# Patient Record
Sex: FEMALE | Race: WHITE | NOT HISPANIC OR LATINO | Employment: FULL TIME | URBAN - METROPOLITAN AREA
[De-identification: names, ages, dates, MRNs, and addresses within clinical notes are randomized per-mention and may not be internally consistent; named-entity substitution may affect disease eponyms.]

---

## 2018-05-13 ENCOUNTER — HOSPITAL ENCOUNTER (EMERGENCY)
Facility: CLINIC | Age: 48
Discharge: HOME | End: 2018-05-13
Attending: EMERGENCY MEDICINE | Admitting: EMERGENCY MEDICINE
Payer: COMMERCIAL

## 2018-05-13 ENCOUNTER — APPOINTMENT (OUTPATIENT)
Dept: ULTRASOUND IMAGING | Facility: CLINIC | Age: 48
End: 2018-05-13
Attending: EMERGENCY MEDICINE
Payer: COMMERCIAL

## 2018-05-13 VITALS
DIASTOLIC BLOOD PRESSURE: 52 MMHG | TEMPERATURE: 98.2 F | RESPIRATION RATE: 18 BRPM | HEIGHT: 66 IN | SYSTOLIC BLOOD PRESSURE: 98 MMHG | OXYGEN SATURATION: 96 %

## 2018-05-13 DIAGNOSIS — R10.2 PELVIC PAIN IN FEMALE: ICD-10-CM

## 2018-05-13 DIAGNOSIS — N84.0 ENDOMETRIAL POLYP: ICD-10-CM

## 2018-05-13 DIAGNOSIS — D25.9 UTERINE LEIOMYOMA, UNSPECIFIED LOCATION: ICD-10-CM

## 2018-05-13 LAB
ALBUMIN SERPL-MCNC: 3.4 G/DL (ref 3.4–5)
ALP SERPL-CCNC: 43 U/L (ref 40–150)
ALT SERPL W P-5'-P-CCNC: 13 U/L (ref 0–50)
ANION GAP SERPL CALCULATED.3IONS-SCNC: 11 MMOL/L (ref 3–14)
AST SERPL W P-5'-P-CCNC: 17 U/L (ref 0–45)
BILIRUB SERPL-MCNC: 0.4 MG/DL (ref 0.2–1.3)
BUN SERPL-MCNC: 13 MG/DL (ref 7–30)
CALCIUM SERPL-MCNC: 8.3 MG/DL (ref 8.5–10.1)
CHLORIDE SERPL-SCNC: 111 MMOL/L (ref 94–109)
CO2 SERPL-SCNC: 21 MMOL/L (ref 20–32)
CREAT SERPL-MCNC: 0.83 MG/DL (ref 0.52–1.04)
ERYTHROCYTE [DISTWIDTH] IN BLOOD BY AUTOMATED COUNT: 12.2 % (ref 10–15)
GFR SERPL CREATININE-BSD FRML MDRD: 73 ML/MIN/1.7M2
GLUCOSE SERPL-MCNC: 102 MG/DL (ref 70–99)
HCT VFR BLD AUTO: 32.9 % (ref 35–47)
HGB BLD-MCNC: 11.3 G/DL (ref 11.7–15.7)
LIPASE SERPL-CCNC: 107 U/L (ref 73–393)
MCH RBC QN AUTO: 30.9 PG (ref 26.5–33)
MCHC RBC AUTO-ENTMCNC: 34.3 G/DL (ref 31.5–36.5)
MCV RBC AUTO: 90 FL (ref 78–100)
PLATELET # BLD AUTO: 234 10E9/L (ref 150–450)
POTASSIUM SERPL-SCNC: 3.2 MMOL/L (ref 3.4–5.3)
PROT SERPL-MCNC: 6.8 G/DL (ref 6.8–8.8)
RBC # BLD AUTO: 3.66 10E12/L (ref 3.8–5.2)
SODIUM SERPL-SCNC: 143 MMOL/L (ref 133–144)
WBC # BLD AUTO: 12.1 10E9/L (ref 4–11)

## 2018-05-13 PROCEDURE — 96375 TX/PRO/DX INJ NEW DRUG ADDON: CPT

## 2018-05-13 PROCEDURE — 96374 THER/PROPH/DIAG INJ IV PUSH: CPT

## 2018-05-13 PROCEDURE — 96361 HYDRATE IV INFUSION ADD-ON: CPT

## 2018-05-13 PROCEDURE — 76856 US EXAM PELVIC COMPLETE: CPT

## 2018-05-13 PROCEDURE — 83690 ASSAY OF LIPASE: CPT | Performed by: EMERGENCY MEDICINE

## 2018-05-13 PROCEDURE — 99285 EMERGENCY DEPT VISIT HI MDM: CPT | Mod: 25

## 2018-05-13 PROCEDURE — 85027 COMPLETE CBC AUTOMATED: CPT | Performed by: EMERGENCY MEDICINE

## 2018-05-13 PROCEDURE — 25000128 H RX IP 250 OP 636: Performed by: EMERGENCY MEDICINE

## 2018-05-13 PROCEDURE — 80053 COMPREHEN METABOLIC PANEL: CPT | Performed by: EMERGENCY MEDICINE

## 2018-05-13 RX ORDER — METHOCARBAMOL 750 MG/1
750 TABLET, FILM COATED ORAL 4 TIMES DAILY PRN
Qty: 15 TABLET | Refills: 0 | Status: SHIPPED | OUTPATIENT
Start: 2018-05-13 | End: 2022-09-20

## 2018-05-13 RX ORDER — HYDROCODONE BITARTRATE AND ACETAMINOPHEN 5; 325 MG/1; MG/1
1 TABLET ORAL EVERY 4 HOURS PRN
Qty: 15 TABLET | Refills: 0 | Status: SHIPPED | OUTPATIENT
Start: 2018-05-13 | End: 2022-09-20

## 2018-05-13 RX ORDER — FLUTICASONE PROPIONATE 50 MCG
1 SPRAY, SUSPENSION (ML) NASAL DAILY
COMMUNITY

## 2018-05-13 RX ORDER — ONDANSETRON 2 MG/ML
4 INJECTION INTRAMUSCULAR; INTRAVENOUS ONCE
Status: COMPLETED | OUTPATIENT
Start: 2018-05-13 | End: 2018-05-13

## 2018-05-13 RX ORDER — MORPHINE SULFATE 4 MG/ML
4 INJECTION, SOLUTION INTRAMUSCULAR; INTRAVENOUS ONCE
Status: COMPLETED | OUTPATIENT
Start: 2018-05-13 | End: 2018-05-13

## 2018-05-13 RX ADMIN — MORPHINE SULFATE 4 MG: 4 INJECTION, SOLUTION INTRAMUSCULAR; INTRAVENOUS at 00:44

## 2018-05-13 RX ADMIN — SODIUM CHLORIDE 1000 ML: 9 INJECTION, SOLUTION INTRAVENOUS at 00:42

## 2018-05-13 RX ADMIN — ONDANSETRON 4 MG: 2 INJECTION INTRAMUSCULAR; INTRAVENOUS at 00:43

## 2018-05-13 NOTE — ED AVS SNAPSHOT
Emergency Department    [address] [facility] [place] [address]    Phone:  [phone]    Fax:  [phone]                                       Pamella Lenz   MRN: [id number]    Department:   Emergency Department   Date of Visit:  5/13/2018           Patient Information     Date Of Birth          1970        Your diagnoses for this visit were:     Endometrial polyp     Pelvic pain in female     Uterine leiomyoma, unspecified location        You were seen by Ronald Sahni MD.      Follow-up Information     Follow up with your ob/gyn In 2 days.        Follow up with  Emergency Department.    Specialty:  EMERGENCY MEDICINE    Why:  If symptoms worsen    Contact information:    [address] [facility] [address] [phone]      Discharge References/Attachments     PELVIC PAIN, UNKNOWN CAUSE (ENGLISH)    UTERINE FIBROIDS (ENGLISH)      24 Hour Appointment Hotline       To make an appointment at any [facility], call [phone] ([phone]). If you don't have a family doctor or clinic, we will help you find one. [facility] clinics are conveniently located to serve the needs of you and your family.             Review of your medicines      START taking        Dose / Directions Last dose taken    HYDROcodone-acetaminophen 5-325 MG per tablet   Commonly known as:  NORCO   Dose:  1 tablet   Quantity:  15 tablet        Take 1 tablet by mouth every 4 hours as needed for pain   Refills:  0        methocarbamol 750 MG tablet   Commonly known as:  ROBAXIN   Dose:  750 mg   Quantity:  15 tablet        Take 1 tablet (750 mg) by mouth 4 times daily as needed for muscle spasms   Refills:  0          Our records show that you are taking the medicines listed below. If these are incorrect, please call your family doctor or clinic.        Dose / Directions Last dose taken    fluticasone 50 MCG/ACT spray   Commonly known as:  FLONASE   Dose:  1 spray        Spray 1 spray into  both nostrils daily   Refills:  0        IBUPROFEN PO        Refills:  0                Information about OPIOIDS     PRESCRIPTION OPIOIDS: WHAT YOU NEED TO KNOW   You have a prescription for an opioid (narcotic) pain medicine. Opioids can cause addiction. If you have a history of chemical dependency of any type, you are at a higher risk of becoming addicted to opioids. Only take this medicine after all other options have been tried. Take it for as short a time and as few doses as possible.     Do not:    Drive. If you drive while taking these medicines, you could be arrested for driving under the influence (DUI).    Operate heavy machinery    Do any other dangerous activities while taking these medicines.     Drink any alcohol while taking these medicines.      Take with any other medicines that contain acetaminophen. Read all labels carefully. Look for the word  acetaminophen  or  Tylenol.  Ask your pharmacist if you have questions or are unsure.    Store your pills in a secure place, locked if possible. We will not replace any lost or stolen medicine. If you don t finish your medicine, please throw away (dispose) as directed by your pharmacist. The [place] [employer]ution [employer] has more information about safe disposal: https://www.pca.[facility].[place].us/living-green/managing-unwanted-medications    All opioids tend to cause constipation. Drink plenty of water and eat foods that have a lot of fiber, such as fruits, vegetables, prune juice, apple juice and high-fiber cereal. Take a laxative (Miralax, milk of magnesia, Colace, Senna) if you don t move your bowels at least every other day.         Prescriptions were sent or printed at these locations (2 Prescriptions)                   Other Prescriptions                Printed at Department/Unit printer (2 of 2)         HYDROcodone-acetaminophen (NORCO) 5-325 MG per tablet               methocarbamol (ROBAXIN) 750 MG tablet                Procedures and tests  performed during your visit     CBC with platelets    Comprehensive metabolic panel    Lipase    US Pelvic Complete with Transvaginal      Orders Needing Specimen Collection     None      Pending Results     No orders found from 5/11/2018 to 5/14/2018.            Pending Culture Results     No orders found from 5/11/2018 to 5/14/2018.            Pending Results Instructions     If you had any lab results that were not finalized at the time of your Discharge, you can call the ED Lab Result RN at [phone]. You will be contacted by this team for any positive Lab results or changes in treatment. The nurses are available 7 days a week from 10A to 6:30P.  You can leave a message 24 hours per day and they will return your call.        Test Results From Your Hospital Stay        5/13/2018 12:53 AM      Component Results     Component Value Ref Range & Units Status    WBC 12.1 (H) 4.0 - 11.0 10e9/L Final    RBC Count 3.66 (L) 3.8 - 5.2 10e12/L Final    Hemoglobin 11.3 (L) 11.7 - 15.7 g/dL Final    Hematocrit 32.9 (L) 35.0 - 47.0 % Final    MCV 90 78 - 100 fl Final    MCH 30.9 26.5 - 33.0 pg Final    MCHC 34.3 31.5 - 36.5 g/dL Final    RDW 12.2 10.0 - 15.0 % Final    Platelet Count 234 150 - 450 10e9/L Final         5/13/2018  1:13 AM      Component Results     Component Value Ref Range & Units Status    Sodium 143 133 - 144 mmol/L Final    Potassium 3.2 (L) 3.4 - 5.3 mmol/L Final    Chloride 111 (H) 94 - 109 mmol/L Final    Carbon Dioxide 21 20 - 32 mmol/L Final    Anion Gap 11 3 - 14 mmol/L Final    Glucose 102 (H) 70 - 99 mg/dL Final    Urea Nitrogen 13 7 - 30 mg/dL Final    Creatinine 0.83 0.52 - 1.04 mg/dL Final    GFR Estimate 73 >60 mL/min/1.7m2 Final    Non  GFR Calc    GFR Estimate If Black 89 >60 mL/min/1.7m2 Final    African American GFR Calc    Calcium 8.3 (L) 8.5 - 10.1 mg/dL Final    Bilirubin Total 0.4 0.2 - 1.3 mg/dL Final    Albumin 3.4 3.4 - 5.0 g/dL Final    Protein Total 6.8 6.8 - 8.8  g/dL Final    Alkaline Phosphatase 43 40 - 150 U/L Final    ALT 13 0 - 50 U/L Final    AST 17 0 - 45 U/L Final         5/13/2018  1:11 AM      Component Results     Component Value Ref Range & Units Status    Lipase 107 73 - 393 U/L Final         5/13/2018  4:26 AM      Narrative     US PELVIC COMPLETE WITH TRANSVAGINAL 5/13/2018 4:06 AM     INDICATION: Pain, spotting, post ablation, history of fibroids.     COMPARISON: None.    FINDINGS: Transabdominal, followed by endovaginal ultrasound to better  evaluate endometrium and ovaries. Uterus measures 10.6 x 6.4 x 6.4 cm.  There are multiple uterine fibroids measuring up to 3.7 cm in the  right uterine fundus. Approximately 5 fibroids are noted. Endometrial  stripe measures 1.2 cm in thickness. Endometrium is complex containing  blood and/or debris and possibly a polyp. Tiny echogenic focus in the  right ovary which could be a tiny dermoid. Left ovary is negative.  Small amount of free pelvic fluid.        Impression     IMPRESSION:  1. Multiple fibroids measuring up to 3.7 cm.  2. Complex endometrial thickening with likely blood and possibly a  polyp in the endometrium. Further evaluation/followup recommended.  3. Echogenic focus in the right ovary may be a tiny dermoid.  4. Small amount of free pelvic fluid.    REZA GO MD                Clinical Quality Measure: Blood Pressure Screening     Your blood pressure was checked while you were in the emergency department today. The last reading we obtained was  BP: 98/52 . Please read the guidelines below about what these numbers mean and what you should do about them.  If your systolic blood pressure (the top number) is less than 120 and your diastolic blood pressure (the bottom number) is less than 80, then your blood pressure is normal. There is nothing more that you need to do about it.  If your systolic blood pressure (the top number) is 120-139 or your diastolic blood pressure (the bottom number) is 80-89,  "your blood pressure may be higher than it should be. You should have your blood pressure rechecked within a year by a primary care provider.  If your systolic blood pressure (the top number) is 140 or greater or your diastolic blood pressure (the bottom number) is 90 or greater, you may have high blood pressure. High blood pressure is treatable, but if left untreated over time it can put you at risk for heart attack, stroke, or kidney failure. You should have your blood pressure rechecked by a primary care provider within the next 4 weeks.  If your provider in the emergency department today gave you specific instructions to follow-up with your doctor or provider even sooner than that, you should follow that instruction and not wait for up to 4 weeks for your follow-up visit.        Thank you for choosing [facility]       Thank you for choosing [facility] for your care. Our goal is always to provide you with excellent care. Hearing back from our patients is one way we can continue to improve our services. Please take a few minutes to complete the written survey that you may receive in the mail after you visit with us. Thank you!        [facility] Information     [facility] lets you send messages to your doctor, view your test results, renew your prescriptions, schedule appointments and more. To sign up, go to [email or web address] . Click on \"Log in\" on the left side of the screen, which will take you to the Welcome page. Then click on \"Sign up Now\" on the right side of the page.     You will be asked to enter the access code listed below, as well as some personal information. Please follow the directions to create your username and password.     Your access code is: [email or web address]  Expires: 2018  4:40 AM     Your access code will  in 90 days. If you need help or a new code, please call your [facility] clinic or [phone].        Care EveryWhere ID     This is your Care EveryWhere ID. This could be used by other " organizations to access your [facility] medical records  [id number]        Equal Access to Services     GUANAKO MOORE : Carl Zuniga, richelle su, gordon washing[place]. So [facility] [phone].    ATENCIÓN: Si habla español, tiene a anderson disposición servicios gratuitos de asistencia lingüística. Llame al [phone].    We comply with applicable federal civil rights laws and [place] laws. We do not discriminate on the basis of race, color, national origin, age, disability, sex, sexual orientation, or gender identity.            After Visit Summary       This is your record. Keep this with you and show to your community pharmacist(s) and doctor(s) at your next visit.

## 2018-05-13 NOTE — ED AVS SNAPSHOT
Emergency Department    [address] [place] [address]    Phone:  [phone]    Fax:  [phone]                                       Pamella Lenz   MRN: [id number]    Department:   Emergency Department   Date of Visit:  5/13/2018           After Visit Summary Signature Page     I have received my discharge instructions, and my questions have been answered. I have discussed any challenges I see with this plan with the nurse or doctor.    ..........................................................................................................................................  Patient/Patient Representative Signature      ..........................................................................................................................................  Patient Representative Print Name and Relationship to Patient    ..................................................               ................................................  Date                                            Time    ..........................................................................................................................................  Reviewed by Signature/Title    ...................................................              ..............................................  Date                                                            Time

## 2018-05-13 NOTE — ED PROVIDER NOTES
History     Chief Complaint:  Abdominal pain    HPI   Pamella Lenz is a 48 year old female, with a history of uterine fibroids and recurrent pelvic pain, status post myomectomy, D&C and endometrial ablation last fall who presents with severe crampy persistent pelvic pain.  Patient reports she has had similar pain around the time she would have her periods since October.  The pain tonight was much more severe and persistent than she had experienced previously.  She has had increasing pain over the last several days, but this afternoon and evening it was not relieved with the ibuprofen that she normally takes.  She denies vaginal bleeding or spotting.  She said she was recently treated for bacterial vaginosis.  She said this pain does not feel similar to the symptoms she has with that.  She has not had any dysuria.  She denies risk factors for STI.  She has not had any unusual vaginal discharge.  She denies nausea, except when her pain is very severe.  The pain is not changed by movement or eating.    Allergies:  No Known Allergies     Medications:      fluticasone (FLONASE) 50 MCG/ACT spray   HYDROcodone-acetaminophen (NORCO) 5-325 MG per tablet   IBUPROFEN PO   methocarbamol (ROBAXIN) 750 MG tablet     Past Medical History:    History reviewed. No pertinent past medical history.   Uterine fibroids  Recurrent pelvic pain  Seasonal allergies    Past Surgical History:    Past Surgical History:   Procedure Laterality Date     BREAST SURGERY       GYN SURGERY         Family History:    Reviewed and noncontributory    Social History:  Marital Status:    Social History   Substance Use Topics     Smoking status: Former Smoker     Smokeless tobacco: Never Used     Alcohol use Yes        Review of Systems  Positive as stated in the HPI, otherwise negative    Physical Exam   Vitals:  Patient Vitals for the past 24 hrs:   BP Temp Temp src Heart Rate Resp SpO2 Height   05/13/18 0421 98/52 - - 98 - 96 % -   05/13/18 0195  "98/52 - - - - 96 % -   05/13/18 0230 100/54 - - - - 95 % -   05/13/18 0215 103/54 - - - - 95 % -   05/13/18 0200 105/60 - - - - 98 % -   05/13/18 0145 101/61 - - - - 96 % -   05/13/18 0130 99/57 - - - - 96 % -   05/13/18 0115 100/59 - - - - 99 % -   05/13/18 0100 102/57 - - - - 100 % -   05/13/18 0045 110/82 - - - - 100 % -   05/13/18 0036 - - - - - 100 % -   05/13/18 0020 (!) 85/36 98.2  F (36.8  C) Oral 84 18 98 % 1.676 m (5' 6\")        Physical Exam  Constitutional:  Appears well-developed and well-nourished. Cooperative.   HENT:   Head:    Atraumatic.   Mouth/Throat:   Oropharynx is without erythema or exudate and mucous     membranes are moist.   Eyes:    Conjunctivae normal and EOM are normal.      Pupils are equal, round, and reactive to light.   Neck:    Normal range of motion.   Cardiovascular:  Normal rate, regular rhythm, normal heart sounds and radial and    dorsalis pedis pulses are 2+ and symmetric.    Pulmonary/Chest:  Effort normal and breath sounds normal.   Abdominal:   Soft. Bowel sounds are normal.      No splenomegaly or hepatomegaly.  Mild diffuse tenderness in the mid low abdomen, no masses. No rebound.   Musculoskeletal:  Normal range of motion. No edema and no tenderness.   Neurological:  Alert. Normal strength. No cranial nerve deficit. GCS 15  Skin:    Skin is warm and dry.   Psychiatric:   Normal mood and affect.     Emergency Department Course       Imaging:  Radiographic findings were communicated with the patient who voiced understanding of the findings.  US pelvic complete with transvaginal:  IMPRESSION:  1. Multiple fibroids measuring up to 3.7 cm.  2. Complex endometrial thickening with likely blood and possibly a  polyp in the endometrium. Further evaluation/followup recommended.  3. Echogenic focus in the right ovary may be a tiny dermoid.  4. Small amount of free pelvic fluid.  Reading per radiology.     Laboratory:  CBC: WBC: 12.1(H), RBC: 3.66(L), HGB: 11.3(L), HCT: 32.9(L), " o/w WNL ( )  CMP: Potassium: 3.2(L), Chloride: 111(H), Glucose: 102(H), Calcium: 8.3(:L), o/w WNL (Creatinine 0.83)  Lipase: 107    Interventions:  Medications   0.9% sodium chloride BOLUS (0 mLs Intravenous Stopped 5/13/18 0247)   ondansetron (ZOFRAN) injection 4 mg (4 mg Intravenous Given 5/13/18 0043)   morphine (PF) injection 4 mg (4 mg Intravenous Given 5/13/18 0044)        Emergency Department Course:  Please see the MDM    Impression & Plan        Medical Decision Making:  Patient presented with severe cramp-like, but persistent low abdominal/pelvic pain.  She said she has had this pain frequently in the past several months and believes it is due to uterine fibroids.  She has had an ablation and myomectomy.  She reports that she and her OB/GYN physician were concerned that there may be some abnormal or trapped bleeding in her uterus that is stimulating painful contractions when she is having her period.    My initial exam the patient was after she had received some morphine per the nursing protocol, and at that time she reported her pain had greatly improved.  White cell count is mildly elevated at 12.1.  Her hemoglobin is borderline 11.3.  The patient said that this is not uncommon.  She said she has only spotting at the time of periods and has had no recent heavy bleeding.  There is no bleeding today.  Potassium little low at 3.2 the remainder of her metabolic panel is unremarkable including hepatic panel and lipase.    Pelvic ultrasound shows fibroids and complex endometrial thickening with likely blood and possibly a polyp in the endometrium 3 allergist recommended further follow-up.  The patient has already had extensive testing including biopsies.  She reports she keeps in close contact with her OB GYN, and he has recommended hysterectomy.  She said she has not done this because it does not fit into her schedule at this time.  She said she will follow-up with OB/GYN next week.    I do not suspect  appendicitis, urinary tract infection, kidney stone, we discussed the possibility of a rectosigmoid diverticulitis, but the patient did not think that this was likely given the pattern of symptoms and the correlation with the time of her..  I think it is reasonable not to CT her at this point, and she prefers not to.  There is no fever or other systemic symptoms suggesting infection.    Her pain improved with the ED intervention.  Repeat abdominal exam remains soft and nontender.  Prior to the ultrasound we had discussed that if there were no dangerous looking findings she would be discharged with pain control and OB/GYN follow-up.  I was not able to discuss the ultrasound results with her, as I was involved with another patient, but the nurse conveyed the results to her and patient voiced understanding.  Patient said she did not wish to wait for me to get back into her room to discuss results, and she felt comfortable going home.    I reviewed the  database and the patient has not had any controlled substances in the last year aside from Sudafed.  I gave her prescription for a few tablets of Norco.  I will also have her try some muscle relaxer as that may provide some additional relief.  She will continue with ibuprofen.  She is given general discharge instructions regarding pelvic pain and advised to return to the ED for concerning symptoms.  Discharged in good condition          Diagnosis:    ICD-10-CM    1. Endometrial polyp N84.0    2. Pelvic pain in female R10.2    3. Uterine leiomyoma, unspecified location D25.9        Disposition:  discharged to home with fiancé    Discharge Medications:  Discharge Medication List as of 5/13/2018  4:40 AM      START taking these medications    Details   HYDROcodone-acetaminophen (NORCO) 5-325 MG per tablet Take 1 tablet by mouth every 4 hours as needed for pain, Disp-15 tablet, R-0, Local Print      methocarbamol (ROBAXIN) 750 MG tablet Take 1 tablet (750 mg) by mouth 4  times daily as needed for muscle spasms, Disp-15 tablet, R-0, Local Print               Jose Henderson  5/13/2018    EMERGENCY DEPARTMENT       Ronald Sahni MD  05/14/18 0815

## 2018-05-15 ENCOUNTER — HEALTH MAINTENANCE LETTER (OUTPATIENT)
Age: 48
End: 2018-05-15

## 2019-07-11 ENCOUNTER — APPOINTMENT (OUTPATIENT)
Age: 49
Setting detail: DERMATOLOGY
End: 2019-07-11

## 2019-07-11 DIAGNOSIS — Z41.9 ENCOUNTER FOR PROCEDURE FOR PURPOSES OTHER THAN REMEDYING HEALTH STATE, UNSPECIFIED: ICD-10-CM

## 2019-07-11 PROCEDURE — OTHER BOTOX: OTHER

## 2019-07-11 NOTE — PROCEDURE: BOTOX
Price (Use Numbers Only, No Special Characters Or $): 726 Price (Use Numbers Only, No Special Characters Or $): 107 no melena/no vomiting/no diarrhea

## 2019-07-11 NOTE — PROCEDURE: BOTOX
Consent: Treatment performed today: BOTOX\\n\\nPositive Hx of: \\n\\nNegative Hx / Denies: Sensitivity/Allergies to: albumin, Gram + bacteria proteins, or anesthetics such as Lidocaine; immune compromised; hypersensitivity reactions; autoimmune disorders; neurological disorders; currently pregnant/breast feeding; recent h/o infections, oral or mucosal symptoms/injections, or dental procedures, recent or planned vaccine(s).\\n\\nTreatment areas reviewed with the patient while holding a hand held mirror.\\nDiscussed the anatomy and anatomical changes of the face associated with and due to the chronological aging process.\\nPatient had realistic expectations. \\n\\nPRE-EXISTING ASYMMETRIES & CONSIDERATIONS TO NOTE: \\n\\nDiscussed the mechanism of action for neuromodulators (Botox/Dysport), the anatomy involved, and possible side effects that include but are not limited to: bruising, swelling, flu-like symptoms, headache, nausea, redness, tingling.  Potential complications include, but are not limited to: allergic reaction, asymmetry, blurred or double vision, infection, loss of muscle tone, ptosis, muscle weakness. \\Almaz questions answered, patient verbalized understanding and an informed consent was signed. \\n\\nTotal Units: \\nDilution: 1:1\\nSee injection diagram for dosage/placement and Botox lot # and expiration date. \\n\\nPatient informed that treatment area(s): *** are considered “off label,” non-FDA approved, and the patient verbalized understanding.\\n\\nPatient instructed not to lie down for 4 hours and for the next 24 hours to limit physical activity to avoid rubbing/massaging the treated areas, against receiving a facial, massage or seeing a chiropractor and washing the face is okay, but to wash with fingertips (no Clarisonic brush, washcloth, etc...).\\n\\nExplained to achieve optimal results and correction combo treatments and, or additional treatments/products (i.e. neurotoxin, dermal fillers, Sculptra, laser/light-based/skin tightening, body contouring/fat reduction, facials, chemical peels, skin pen, skin care) may be necessary following the completion of initial treatment recommendations and for maintenance. \\n\\nCONSIDER: \\n\\nSKIN CARE:\\n\\nRTC in 2 weeks if additional softening is needed.\\nRTC in 3 months to refresh Botox.\\n\\nCOLOR KEY FOR DIAGRAM BELOW:\\nRED = 1/2 unit of Botox\\nORANGE = 1 unit of Botox\\nYELLOW = 2 units of Botox\\nGREEN = 3 units of Botox \\nBLUE = 4 units of Botox\\nBROWN = 5 units of Botox \\nBLACK = 6 units of Botox

## 2019-11-12 ENCOUNTER — APPOINTMENT (OUTPATIENT)
Age: 49
Setting detail: DERMATOLOGY
End: 2019-11-12

## 2019-11-12 DIAGNOSIS — Z41.9 ENCOUNTER FOR PROCEDURE FOR PURPOSES OTHER THAN REMEDYING HEALTH STATE, UNSPECIFIED: ICD-10-CM

## 2019-11-12 PROCEDURE — OTHER BOTOX: OTHER

## 2019-11-12 NOTE — PROCEDURE: BOTOX
Price (Use Numbers Only, No Special Characters Or $): 112 Price (Use Numbers Only, No Special Characters Or $): 308

## 2019-11-12 NOTE — PROCEDURE: BOTOX
Consent: BOTOX  - Treatment Performed Today.  \\n\\n4 month Botox - Willa returns to refresh her Botox.  I treated her GABI for the 1st time last time; she commented that she \"didn't like how it felt.\"   Interested in additional softening of her glabella, which I will gradually increase and she will RTC for a recheck in 2 weeks post her vacation.    \\n  \\nPositive Hx: cold sores. \\n\\nNegative Hx / Denies: sensitivity/Allergies to: albumin, Gram + bacteria proteins, or anesthetics such as Lidocaine, autoimmune disease/disorder, neurological disorders,  immune compromised; hypersensitivity reaction; currently pregnant/breast feeding; recent h/o infections, oral or mucosal symptoms/injections, or dental procedures. \\n\\nTreatment areas reviewed with the patient while holding a hand held mirror.\\nDiscussed the anatomy and anatomical changes of the face associated with and due to the chronological aging process.\\nPatient had realistic expectations. \\nFACE ASSESSMENT with CALIPERS performed today. \\n \\nPRE-EXISTING ASYMMETRIES & CONSIDERATIONS TO NOTE:  strong mentalis and GABI.\\n\\nDiscussed the mechanism of action for neuromodulators (Botox/Dysport), the anatomy involved, and possible side effects that include but are not limited to: bruising, swelling, flu-like symptoms, headache, nausea, redness, tingling.  Potential complications include, but are not limited to: allergic reaction, asymmetry, blurred or double vision, infection, loss of muscle tone, ptosis, muscle weakness. \\Almaz questions answered, patient verbalized understanding and an informed consent was signed. \\n\\nTotal Units: 48\\nDilution: 1:1\\n\\nPatient informed that treatment to the following area(s) is considered \"off-label\" and non-FDA approved: none\\nVerbally instructed and reminded not to lie down for 4 hours and for the next 24 hours to limit physical activity to avoid rubbing/massaging the treated areas, against receiving a facial, massage or seeing a chiropractor and washing the face is okay, but to wash with fingertips (no Clarisonic brush, washcloth, etc...).\\nExplained to achieve optimal results and correction combo treatments and, or additional treatments/products (i.e. neurotoxin, dermal fillers, skin care, etc...) may be necessary following the completion of initial treatment recommendations and for maintenance. \\n\\nCONSIDER:  Invited to our Nov 21st SkinBash @ [facility] in [place].     Recommended Botox & consider Voluma x 1 for maintenance of her cheeks. \\n\\nSKIN CARE: Applied HA5 & Intellishade. \\n\\nRTC  in 2 weeks if additional softening is needed.    Interested in additional softening of her glabella, which I will gradually increase and she will RTC for a recheck in 2 weeks post her vacation.    \\nRTC in 3-4 months to refresh Botox\\n\\nBrochures Given Today: [facility] Sp Menu, SkinBash Flyer & token.   \\n\\nCOLOR KEY FOR DIAGRAM BELOW:\\nRED = 1/2 unit of Botox\\nORANGE = 1 unit of Botox\\nYELLOW = 2 units of Botox\\nGREEN = 3 units of Botox \\nBLUE = 4 units of Botox\\nBROWN = 5 units of Botox \\nBLACK = * Filler * Consent: BOTOX  - Treatment Performed Today.  \\n\\n4 month Botox - Willa returns to refresh her Botox.  I treated her GABI for the 1st time last time; she commented that she \"didn't like how it felt.\"   Interested in additional softening of her glabella, which I will gradually increase and she will RTC for a recheck in 2 weeks post her vacation.    \\n  \\nPositive Hx: cold sores. \\n\\nNegative Hx / Denies: sensitivity/Allergies to: albumin, Gram + bacteria proteins, or anesthetics such as Lidocaine, autoimmune disease/disorder, neurological disorders,  immune compromised; hypersensitivity reaction; currently pregnant/breast feeding; recent h/o infections, oral or mucosal symptoms/injections, or dental procedures. \\n\\nTreatment areas reviewed with the patient while holding a hand held mirror.\\nDiscussed the anatomy and anatomical changes of the face associated with and due to the chronological aging process.\\nPatient had realistic expectations. \\nFACE ASSESSMENT with CALIPERS performed today. \\n \\nPRE-EXISTING ASYMMETRIES & CONSIDERATIONS TO NOTE:  strong mentalis and GABI.\\n\\nDiscussed the mechanism of action for neuromodulators (Botox/Dysport), the anatomy involved, and possible side effects that include but are not limited to: bruising, swelling, flu-like symptoms, headache, nausea, redness, tingling.  Potential complications include, but are not limited to: allergic reaction, asymmetry, blurred or double vision, infection, loss of muscle tone, ptosis, muscle weakness. \\Almaz questions answered, patient verbalized understanding and an informed consent was signed. \\n\\nTotal Units: 48\\nDilution: 1:1\\n\\nPatient informed that treatment to the following area(s) is considered \"off-label\" and non-FDA approved: none\\nVerbally instructed and reminded not to lie down for 4 hours and for the next 24 hours to limit physical activity to avoid rubbing/massaging the treated areas, against receiving a facial, massage or seeing a chiropractor and washing the face is okay, but to wash with fingertips (no Clarisonic brush, washcloth, etc...).\\nExplained to achieve optimal results and correction combo treatments and, or additional treatments/products (i.e. neurotoxin, dermal fillers, skin care, etc...) may be necessary following the completion of initial treatment recommendations and for maintenance. \\n\\nCONSIDER:  Invited to our Nov 21st SkinBash @ [facility] in [place].     Recommended Botox & consider Voluma x 1 for maintenance of her cheeks. \\n\\nSKIN CARE: Applied HA5 & Intellishade. \\n\\nRTC  in 2 weeks if additional softening is needed.    Interested in additional softening of her glabella, which I will gradually increase and she will RTC for a recheck in 2 weeks post her vacation.    \\nRTC in 3-4 months to refresh Botox\\n\\nBrochures Given Today: [facility] Sp Menu, SkinBash Flyer & token.   \\n\\nCOLOR KEY FOR DIAGRAM BELOW:\\nRED = 1/2 unit of Botox\\nORANGE = 1 unit of Botox\\nYELLOW = 2 units of Botox\\nGREEN = 3 units of Botox \\nBLUE = 4 units of Botox\\nBROWN = 5 units of Botox \\nBLACK = * Filler *

## 2019-12-03 ENCOUNTER — APPOINTMENT (OUTPATIENT)
Age: 49
Setting detail: DERMATOLOGY
End: 2019-12-03

## 2019-12-03 DIAGNOSIS — Z41.9 ENCOUNTER FOR PROCEDURE FOR PURPOSES OTHER THAN REMEDYING HEALTH STATE, UNSPECIFIED: ICD-10-CM

## 2019-12-03 PROCEDURE — OTHER COSMETIC FOLLOW-UP: OTHER

## 2019-12-03 NOTE — PROCEDURE: COSMETIC FOLLOW-UP
Comments (Free Text): 2 WEEK BOTOX RE-CHECK\\n\\nKristen returns today in order for me to assess her glabellar region. \\n At her last treatment, 2 weeks ago she requested additional softening of her glabella.   We elected to gradually increase her dose and she returns to assess it together today.
Detail Level: Zone

## 2020-03-11 ENCOUNTER — HEALTH MAINTENANCE LETTER (OUTPATIENT)
Age: 50
End: 2020-03-11

## 2021-01-03 ENCOUNTER — HEALTH MAINTENANCE LETTER (OUTPATIENT)
Age: 51
End: 2021-01-03

## 2021-03-07 ENCOUNTER — HEALTH MAINTENANCE LETTER (OUTPATIENT)
Age: 51
End: 2021-03-07

## 2021-04-11 ENCOUNTER — APPOINTMENT (OUTPATIENT)
Dept: GENERAL RADIOLOGY | Facility: CLINIC | Age: 51
End: 2021-04-11
Attending: NURSE PRACTITIONER
Payer: COMMERCIAL

## 2021-04-11 ENCOUNTER — HOSPITAL ENCOUNTER (EMERGENCY)
Facility: CLINIC | Age: 51
Discharge: HOME | End: 2021-04-11
Attending: NURSE PRACTITIONER | Admitting: NURSE PRACTITIONER
Payer: COMMERCIAL

## 2021-04-11 VITALS
TEMPERATURE: 98.4 F | HEART RATE: 83 BPM | DIASTOLIC BLOOD PRESSURE: 79 MMHG | OXYGEN SATURATION: 96 % | SYSTOLIC BLOOD PRESSURE: 115 MMHG | RESPIRATION RATE: 17 BRPM

## 2021-04-11 DIAGNOSIS — R53.83 LETHARGY: ICD-10-CM

## 2021-04-11 PROBLEM — K59.04 CHRONIC IDIOPATHIC CONSTIPATION: Status: ACTIVE | Noted: 2020-01-27

## 2021-04-11 PROBLEM — D75.839 THROMBOCYTHEMIA: Status: ACTIVE | Noted: 2018-12-03

## 2021-04-11 PROBLEM — L50.9 URTICARIA: Status: ACTIVE | Noted: 2018-12-03

## 2021-04-11 PROBLEM — N90.89 VULVAR LESION: Status: ACTIVE | Noted: 2017-03-21

## 2021-04-11 PROBLEM — N81.11 MIDLINE CYSTOCELE: Status: ACTIVE | Noted: 2020-02-06

## 2021-04-11 PROBLEM — S46.009A INJURY OF TENDON OF ROTATOR CUFF: Status: ACTIVE | Noted: 2021-04-11

## 2021-04-11 LAB
ALBUMIN SERPL-MCNC: 3.6 G/DL (ref 3.4–5)
ALBUMIN UR-MCNC: NEGATIVE MG/DL
ALP SERPL-CCNC: 55 U/L (ref 40–150)
ALT SERPL W P-5'-P-CCNC: 25 U/L (ref 0–50)
ANION GAP SERPL CALCULATED.3IONS-SCNC: 4 MMOL/L (ref 3–14)
APPEARANCE UR: CLEAR
AST SERPL W P-5'-P-CCNC: 13 U/L (ref 0–45)
BACTERIA #/AREA URNS HPF: ABNORMAL /HPF
BASOPHILS # BLD AUTO: 0.1 10E9/L (ref 0–0.2)
BASOPHILS NFR BLD AUTO: 0.7 %
BILIRUB SERPL-MCNC: 0.7 MG/DL (ref 0.2–1.3)
BILIRUB UR QL STRIP: NEGATIVE
BUN SERPL-MCNC: 9 MG/DL (ref 7–30)
CALCIUM SERPL-MCNC: 8.6 MG/DL (ref 8.5–10.1)
CHLORIDE SERPL-SCNC: 109 MMOL/L (ref 94–109)
CO2 SERPL-SCNC: 26 MMOL/L (ref 20–32)
COLOR UR AUTO: ABNORMAL
CREAT SERPL-MCNC: 0.65 MG/DL (ref 0.52–1.04)
DIFFERENTIAL METHOD BLD: NORMAL
EOSINOPHIL # BLD AUTO: 0.1 10E9/L (ref 0–0.7)
EOSINOPHIL NFR BLD AUTO: 1 %
ERYTHROCYTE [DISTWIDTH] IN BLOOD BY AUTOMATED COUNT: 11.9 % (ref 10–15)
GFR SERPL CREATININE-BSD FRML MDRD: >90 ML/MIN/{1.73_M2}
GLUCOSE SERPL-MCNC: 92 MG/DL (ref 70–99)
GLUCOSE UR STRIP-MCNC: NEGATIVE MG/DL
HCT VFR BLD AUTO: 39.6 % (ref 35–47)
HGB BLD-MCNC: 13.4 G/DL (ref 11.7–15.7)
HGB UR QL STRIP: NEGATIVE
IMM GRANULOCYTES # BLD: 0 10E9/L (ref 0–0.4)
IMM GRANULOCYTES NFR BLD: 0.1 %
INTERPRETATION ECG - MUSE: NORMAL
KETONES UR STRIP-MCNC: 10 MG/DL
LEUKOCYTE ESTERASE UR QL STRIP: NEGATIVE
LYMPHOCYTES # BLD AUTO: 1.1 10E9/L (ref 0.8–5.3)
LYMPHOCYTES NFR BLD AUTO: 16.4 %
MCH RBC QN AUTO: 30.8 PG (ref 26.5–33)
MCHC RBC AUTO-ENTMCNC: 33.8 G/DL (ref 31.5–36.5)
MCV RBC AUTO: 91 FL (ref 78–100)
MONOCYTES # BLD AUTO: 0.5 10E9/L (ref 0–1.3)
MONOCYTES NFR BLD AUTO: 7.2 %
MUCOUS THREADS #/AREA URNS LPF: PRESENT /LPF
NEUTROPHILS # BLD AUTO: 5 10E9/L (ref 1.6–8.3)
NEUTROPHILS NFR BLD AUTO: 74.6 %
NITRATE UR QL: NEGATIVE
NRBC # BLD AUTO: 0 10*3/UL
NRBC BLD AUTO-RTO: 0 /100
PH UR STRIP: 6.5 PH (ref 5–7)
PLATELET # BLD AUTO: 392 10E9/L (ref 150–450)
POTASSIUM SERPL-SCNC: 3.8 MMOL/L (ref 3.4–5.3)
PROT SERPL-MCNC: 7.2 G/DL (ref 6.8–8.8)
RBC # BLD AUTO: 4.35 10E12/L (ref 3.8–5.2)
RBC #/AREA URNS AUTO: <1 /HPF (ref 0–2)
SODIUM SERPL-SCNC: 139 MMOL/L (ref 133–144)
SOURCE: ABNORMAL
SP GR UR STRIP: 1.01 (ref 1–1.03)
SQUAMOUS #/AREA URNS AUTO: <1 /HPF (ref 0–1)
TROPONIN I SERPL-MCNC: <0.015 UG/L (ref 0–0.04)
UROBILINOGEN UR STRIP-MCNC: 0 MG/DL (ref 0–2)
WBC # BLD AUTO: 6.7 10E9/L (ref 4–11)
WBC #/AREA URNS AUTO: <1 /HPF (ref 0–5)

## 2021-04-11 PROCEDURE — 96374 THER/PROPH/DIAG INJ IV PUSH: CPT

## 2021-04-11 PROCEDURE — 250N000011 HC RX IP 250 OP 636: Performed by: NURSE PRACTITIONER

## 2021-04-11 PROCEDURE — 84484 ASSAY OF TROPONIN QUANT: CPT | Performed by: NURSE PRACTITIONER

## 2021-04-11 PROCEDURE — 96361 HYDRATE IV INFUSION ADD-ON: CPT

## 2021-04-11 PROCEDURE — 93005 ELECTROCARDIOGRAM TRACING: CPT

## 2021-04-11 PROCEDURE — 85025 COMPLETE CBC W/AUTO DIFF WBC: CPT | Performed by: NURSE PRACTITIONER

## 2021-04-11 PROCEDURE — 71045 X-RAY EXAM CHEST 1 VIEW: CPT

## 2021-04-11 PROCEDURE — 99285 EMERGENCY DEPT VISIT HI MDM: CPT | Mod: 25

## 2021-04-11 PROCEDURE — 80053 COMPREHEN METABOLIC PANEL: CPT | Performed by: NURSE PRACTITIONER

## 2021-04-11 PROCEDURE — 81001 URINALYSIS AUTO W/SCOPE: CPT | Performed by: NURSE PRACTITIONER

## 2021-04-11 PROCEDURE — 258N000003 HC RX IP 258 OP 636: Performed by: NURSE PRACTITIONER

## 2021-04-11 RX ORDER — ONDANSETRON 2 MG/ML
4 INJECTION INTRAMUSCULAR; INTRAVENOUS EVERY 30 MIN PRN
Status: DISCONTINUED | OUTPATIENT
Start: 2021-04-11 | End: 2021-04-11 | Stop reason: HOSPADM

## 2021-04-11 RX ADMIN — ONDANSETRON 4 MG: 2 INJECTION INTRAMUSCULAR; INTRAVENOUS at 13:34

## 2021-04-11 RX ADMIN — SODIUM CHLORIDE 1000 ML: 9 INJECTION, SOLUTION INTRAVENOUS at 13:32

## 2021-04-11 ASSESSMENT — ENCOUNTER SYMPTOMS
HEMATURIA: 0
FREQUENCY: 0
DIARRHEA: 1
ABDOMINAL PAIN: 0
SHORTNESS OF BREATH: 1
DYSURIA: 0
DIZZINESS: 1
NAUSEA: 1

## 2021-04-11 NOTE — ED TRIAGE NOTES
"Pt states she tested positive for covid a month ago. Pt went to ED last week at [facility] and had a CT scan done and she states it was normal. She is not feeling better, She states she is not sleeping, drinking or eating. Also he skin gets \"icey hot\"  "

## 2021-04-11 NOTE — ED PROVIDER NOTES
History   Chief Complaint:  Covid Concern and Anxiety      HPI   Pamella Lenz is a 50 year old female, diagnosed with COVID a month ago (3/12/2021), who presents to the ED for evaluation of dizziness, nausea, anorexia, and anxiety. The patient reports she did not have a fever or cough when she had COVID, only experienced nausea and dizziness.  She felt slightly improved but 2 weeks ago noted worsening of nausea, anorexia, dizziness.  She does not have focal numbness or weakness, presyncope, aphasia, difficulty with ambulating.  Patient went to [facility] 4/8/2021 and received a head and neck CT/CTA, see below and had a negative D-dimer. Says she is nauseous when she gets up and moves around. Also experiencing some intermittent diarrhea and hot flashes.  She has tried Ambien, Ativan, Benadryl, Zofran without significant success.  She was also seen at primary care on 4/5/2021 and had TSH which was normal and a Holter monitor was initiated per cardiology 4/8/2021.  Denies urinary symptoms or abdominal pain.  Of note, patient did start Lexapro 2 days ago per cardiology.  She has a referral to a Covid clinic in [place] this week.    CTA Head/Neck and CT Head Results from 4/9/21:  1. No acute intracranial findings.  2. Patent head and neck vasculature.    Review of Systems   Constitutional:        (+) hot flashes   Respiratory: Positive for shortness of breath.    Gastrointestinal: Positive for diarrhea and nausea. Negative for abdominal pain.   Genitourinary: Negative for dysuria, frequency and hematuria.   Neurological: Positive for dizziness.   All other systems reviewed and are negative.    Allergies:  No Known Allergies    Medications:    Antivert  Desyrel  Vivelle Dot BC Patch  Allegra  Zofran  Ambien    Past Medical History:     Rotator cuff tendon injury  Midline cystocele  Chronic idiopathic constipation  Thrombocythemia  Urticaria  Vulvar lesion  Nasal polyp  Allergic rhinitis    Past Surgical  History:    Total vaginal hysterectomy  Breast augmentation    Family History:    Prostate cancer  Cervical cancer  Diabetes  Stroke    Social History:  Marital Status: Single  PCP: Helen Spence  Presents to the ED with self    Physical Exam     Patient Vitals for the past 24 hrs:   BP Temp Pulse Resp SpO2   04/11/21 1555 -- -- -- -- 96 %   04/11/21 1553 115/79 -- 83 -- --   04/11/21 1207 114/60 98.4  F (36.9  C) 97 17 94 %       Physical Exam  Nursing notes reviewed. Vitals reviewed.  General: Alert. Well kept.  Eyes:  Conjunctiva non-injected, non-icteric.  Neck/Throat: Moist mucous membranes. Normal voice.  Cardiac: Regular rhythm. Normal heart sounds.  Pulmonary: Clear and equal breath sounds bilaterally.   Abdomen: soft and non-tender.  Musculoskeletal: Normal gross range of motion of all 4 extremities.   Skin: Warm and dry. Normal appearance of visualized exposed skin without rashes or petechiae.  Psych: Affect normal. Good eye contact.  Neurological:  Mental: alert and oriented x 4, follows commands, no aphasia or dysarthria.   Cranial Nerves:  CN II: visual acuity - able to accurately count fingers with each eye. Visual fields intact  CN III, IV, VI: EOM intact, pupils equal and reactive  CN V: facial sensation nl  CN VII: face symmetric, no facial droop  CN VIII: hearing normal  CN IX: palate elevation symmetric, uvula at midline  CN XI SCM normal, shoulder shrug nl  CN XII: tongue midline  Motor: Strength: 5/5 in all major groups of all extremities. Normal tone. No abnormal movements. No pronator drift b/l.  Sensory: temperature, light touch intact. Romberg: negative.  Coordination: FNF and heel-shin tests intact b/l.   Gait:  Normal.    Emergency Department Course   ECG  ECG taken at 1333, ECG read at 1335  Normal sinus rhythm. Normal ECG.   Rate 79 bpm. UT interval 138 ms. QRS duration 88 ms. QT/QTc 370/424 ms. P-R-T axes 49 77 65.     Imaging:  Radiology findings were communicated with the patient  who voiced understanding of the findings.    XR Chest, Port 1 View:  No evidence of acute cardiopulmonary disease is seen.    Reading per radiology    Laboratory:  Laboratory findings were communicated with the patient who voiced understanding of the findings.    CBC: WBC: 6.7, HGB: 13.4, PLT: 392    CMP: WNL (Creatinine: 0.65)    UA: Ketones: 10, Bacteria: Few, Mucous: Present, o/w Negative    Troponin (Collected 1329): <0.015     Emergency Department Course:    Reviewed:  I reviewed the patient's nursing notes, vitals, past medical records, Care Everywhere.     Assessments:  1311 I first assessed the patient and performed an exam. Discussed plans for care.    1515 I rechecked the patient and updated them on their results. Discussed plans for discharge.    Interventions:  1332: NS 1L IV Bolus   1334: Zofran 4 mg IV    Disposition:  The patient was discharged to home.       Impression & Plan    Medical Decision Making:   Pamella Lenz is a 50 year old female who presents with dizziness and lethargy.  I considered cardiac arrhythmia, ACS, aortic stenosis, HOCM, PE, orthostatic hypotension, drugs, situational, carotid hypersensitivity, seizure, TIA, stroke, vasovagal. The EKG was reviewed and shows no evidence of Brugada syndrome, Segura-Parkinson-White, hypertrophic cardiomyopathy or prolonged QTc syndrome.  EKG also not suggestive of ischemia and troponin negative.  Cardiac exam normal without murmur suggestive of hypertrophic cardiomyopathy or aortic stenosis.  D-dimer negative 2 days ago and with no tachycardia or hypoxia PE considered unlikely.  Her neurologic exam is without abnormality and she had a normal CT and CTA of the head and neck 2 days ago.  CBC and CMP unremarkable and urine is without sign of infection.  Chest x-ray also negative.  The patient was given IV fluids and Zofran and had improvement in symptoms.  She tolerated p.o. challenge and felt hungry while in the emergency department.  She has no  abdominal pain or indication for abdominal imaging.  No indication for admission or further work-up today.  She will continue to follow-up with primary care, cardiology for her event monitor, outpatient Covid clinic.    Diagnosis:     ICD-10-CM    1. Lethargy  R53.83        Discharge Medications:  New Prescriptions    No medications on file        Scribe Disclosure:  I, Lily Price, am serving as a scribe on 4/11/2021 at 1:11 PM to personally document services performed by Hilda Arriaga CNP based on my observations and the provider's statements to me.           [place], Hilda, CNP  04/11/21 2142

## 2021-04-21 ENCOUNTER — TELEPHONE (OUTPATIENT)
Dept: NEUROLOGY | Facility: CLINIC | Age: 51
End: 2021-04-21

## 2021-04-21 ENCOUNTER — TRANSFERRED RECORDS (OUTPATIENT)
Dept: HEALTH INFORMATION MANAGEMENT | Facility: CLINIC | Age: 51
End: 2021-04-21

## 2021-04-21 NOTE — TELEPHONE ENCOUNTER
[facility] Call Center    Phone Message    May a detailed message be left on voicemail: yes     Reason for Call: Other: pt is asking for Nurse Layla to call her back again, please. Thank you.     Action Taken: Message routed to:  [facility]    Travel Screening: Not Applicable

## 2021-04-21 NOTE — TELEPHONE ENCOUNTER
I called pt back. I left a voicemail I do not see a referral from Helen Spence for Covid clinic. I will have staff reach out to her office to try to re-fax the required referral.     Layla WARNER

## 2021-04-21 NOTE — TELEPHONE ENCOUNTER
[facility] Call Center    Phone Message    May a detailed message be left on voicemail: yes     Reason for Call: Other: Patient calling in regards to referral for Post-Covid clinic. Patient states that Helen Howardn NP sent over a referral multiple times and again yesterday 4/20 to the Neurology clinic fax. Patient states that she is anxious about not having a referral since she really wants to be seen in Neurology.     Writer noticed orders from care everywhere from 4/8 about orders that states diagnosis of history of Covid-19 but the referral isn't in chart and does not seem to be listed as Post-Covid referral. Writer unsure if Neurology clinic received fax from patients PCP yesterday    Please advise and call patient back at your earliest convenience if referral needs to be re-sent again     Action Taken: Other: [facility]  NEUROLOGY    Travel Screening: Not Applicable

## 2021-04-22 NOTE — TELEPHONE ENCOUNTER
I called pt back. I let her know I do not know where her referral was sent as at this time it is not in her chart. We left a voicemail with her PCP office last evening asking they send the referral to our neurology clinic fax directly. We are waiting to here back.     I let her know once we receive the referral we will give her an update.     Layla WARNER

## 2021-04-25 ENCOUNTER — HEALTH MAINTENANCE LETTER (OUTPATIENT)
Age: 51
End: 2021-04-25

## 2021-04-26 ENCOUNTER — TELEPHONE (OUTPATIENT)
Dept: NEUROLOGY | Facility: CLINIC | Age: 51
End: 2021-04-26

## 2021-04-26 NOTE — TELEPHONE ENCOUNTER
I called pt and left an update that I heard from my manager.  It looks like there are records that are making it into the system that were faxed on 4/22 from her PCP. The referral is most likely waiting to be scanned as well. I noted this in her appt note for the post covid visit on 4/29 and made sure to add this visit should not be cancelled.     Layla WARNER

## 2021-04-26 NOTE — TELEPHONE ENCOUNTER
I returned pt call. I let her know I cannot see any referral has been received. I double checked and no faxes have arrived. She said she spoke to someone in scheduling who thought they found the referral and she should be good to go.     I did let her know if they sent it to the referral team they maybe able to see the records before me. I am not sure how to contact this team to verify. I will reach out to my mananger Demi Cooper for assistance to see if we can have the patient in contact with the correct team.     Layla WARNER

## 2021-04-26 NOTE — TELEPHONE ENCOUNTER
[facility] Call Center    Phone Message    May a detailed message be left on voicemail: yes     Reason for Call: Other: Pamella calling to request a call back. She would like to speak to Layla in regards to her upcoming apt. Please call her back to discuss.      Action Taken: Message routed to:  [facility]  neuro     Travel Screening: Not Applicable

## 2021-10-10 ENCOUNTER — HEALTH MAINTENANCE LETTER (OUTPATIENT)
Age: 51
End: 2021-10-10

## 2022-02-03 ENCOUNTER — APPOINTMENT (OUTPATIENT)
Dept: URBAN - METROPOLITAN AREA CLINIC 257 | Age: 52
Setting detail: DERMATOLOGY
End: 2022-02-04

## 2022-02-03 DIAGNOSIS — L21.8 OTHER SEBORRHEIC DERMATITIS: ICD-10-CM

## 2022-02-03 DIAGNOSIS — L57.8 OTHER SKIN CHANGES DUE TO CHRONIC EXPOSURE TO NONIONIZING RADIATION: ICD-10-CM

## 2022-02-03 DIAGNOSIS — L82.0 INFLAMED SEBORRHEIC KERATOSIS: ICD-10-CM

## 2022-02-03 DIAGNOSIS — L81.4 OTHER MELANIN HYPERPIGMENTATION: ICD-10-CM

## 2022-02-03 PROCEDURE — OTHER BENIGN DESTRUCTION: OTHER

## 2022-02-03 PROCEDURE — 17110 DESTRUCT B9 LESION 1-14: CPT

## 2022-02-03 PROCEDURE — OTHER MIPS QUALITY: OTHER

## 2022-02-03 PROCEDURE — OTHER COUNSELING: OTHER

## 2022-02-03 PROCEDURE — OTHER PRESCRIPTION: OTHER

## 2022-02-03 PROCEDURE — OTHER LIQUID NITROGEN: OTHER

## 2022-02-03 PROCEDURE — 99214 OFFICE O/P EST MOD 30 MIN: CPT | Mod: 25

## 2022-02-03 RX ORDER — KETOCONAZOLE 20 MG/G
CREAM TOPICAL
Qty: 15 | Refills: 2 | Status: ERX | COMMUNITY
Start: 2022-02-03

## 2022-02-03 RX ORDER — HYDROCORTISONE 25 MG/G
CREAM TOPICAL
Qty: 20 | Refills: 2 | Status: ERX | COMMUNITY
Start: 2022-02-03

## 2022-02-03 ASSESSMENT — LOCATION SIMPLE DESCRIPTION DERM
LOCATION SIMPLE: RIGHT EYELID
LOCATION SIMPLE: GLABELLA
LOCATION SIMPLE: LEFT CHEEK
LOCATION SIMPLE: RIGHT CHEEK
LOCATION SIMPLE: CHEST

## 2022-02-03 ASSESSMENT — LOCATION DETAILED DESCRIPTION DERM
LOCATION DETAILED: LEFT SUPERIOR CENTRAL BUCCAL CHEEK
LOCATION DETAILED: LEFT CENTRAL MALAR CHEEK
LOCATION DETAILED: RIGHT MEDIAL SUPERIOR CHEST
LOCATION DETAILED: RIGHT MEDIAL CANTHUS
LOCATION DETAILED: RIGHT SUPERIOR LATERAL MALAR CHEEK
LOCATION DETAILED: RIGHT CENTRAL MALAR CHEEK
LOCATION DETAILED: RIGHT INFERIOR CENTRAL MALAR CHEEK
LOCATION DETAILED: GLABELLA

## 2022-02-03 ASSESSMENT — LOCATION ZONE DERM
LOCATION ZONE: EYELID
LOCATION ZONE: FACE
LOCATION ZONE: TRUNK

## 2022-02-03 NOTE — PROCEDURE: BENIGN DESTRUCTION
Anesthesia Volume In Cc: 0
Add 52 Modifier (Optional): no
Medical Necessity Information: It is in your best interest to select a reason for this procedure from the list below. All of these items fulfill various CMS LCD requirements except the new and changing color options.
Consent: The patient's consent was obtained including but not limited to risks of crusting, scabbing, blistering, scarring, darker or lighter pigmentary change, recurrence, incomplete removal and infection.
Medical Necessity Clause: This procedure was medically necessary because the lesions that were treated were:
Post-Care Instructions: I reviewed with the patient in detail post-care instructions. Patient is to wear sunprotection, and avoid picking at any of the treated lesions. Pt may apply Vaseline to crusted or scabbing areas.
Detail Level: Detailed

## 2022-02-03 NOTE — PROCEDURE: LIQUID NITROGEN
Render In Bullet Format When Appropriate: No
Medical Necessity Clause: This procedure was medically necessary because the lesions that were treated were:
Render Post Care In The Note?: yes
Consent: The patient's consent was obtained including but not limited to risks of crusting, scabbing, blistering, scarring, darker or lighter pigmentary change, recurrence, incomplete removal and infection.
Total Number Of Lesions Treated: 10
Post-Care Instructions: I reviewed with the patient in detail post-care instructions. Patient is to wear sunprotection, and avoid picking at any of the treated lesions. Pt may apply Vaseline to crusted or scabbing areas.
Spray Paint Text: The liquid nitrogen was applied to the skin utilizing a spray paint frosting technique.
Medical Necessity Information: It is in your best interest to select a reason for this procedure from the list below. All of these items fulfill various CMS LCD requirements except the new and changing color options.
Detail Level: Zone
Duration Of Freeze Thaw-Cycle (Seconds): 0

## 2022-02-03 NOTE — PROCEDURE: MIPS QUALITY
Detail Level: Detailed
Quality 130: Documentation Of Current Medications In The Medical Record: Current Medications Documented
Quality 226: Preventive Care And Screening: Tobacco Use: Screening And Cessation Intervention: Patient screened for tobacco use and is an ex/non-smoker
Quality 110: Preventive Care And Screening: Influenza Immunization: Influenza Immunization Ordered or Recommended, but not Administered due to system reason
Quality 431: Preventive Care And Screening: Unhealthy Alcohol Use - Screening: Patient screened for unhealthy alcohol use using a single question and scores less than 2 times per year

## 2022-03-26 ENCOUNTER — HEALTH MAINTENANCE LETTER (OUTPATIENT)
Age: 52
End: 2022-03-26

## 2022-05-21 ENCOUNTER — HEALTH MAINTENANCE LETTER (OUTPATIENT)
Age: 52
End: 2022-05-21

## 2022-09-15 NOTE — TELEPHONE ENCOUNTER
RECORDS RECEIVED FROM:    DATE RECEIVED:    NOTES STATUS DETAILS   OFFICE NOTE from referring provider    N/A    OFFICE NOTE from other cardiologists   and neurologists Care Everywhere 7-12-22 Dr. Richard Ortega   DISCHARGE SUMMARY from hospital    N/A    DISCHARGE REPORT from the ER   N/A    OPERATIVE REPORT    N/A    MEDICATION LIST   Internal    LABS     BMP   Care Everywhere 4-8-21   CBC   Care Everywhere 5-7-21   CMP   Care Everywhere 5-3-21   Lipids   Care Everywhere 5-3-21   TSH   Care Everywhere 4-5-21   DIAGNOSTIC PROCEDURES     EKG  Strips *important Internal 4-11-21   Monitor Reports  Strips *important Care Everywhere 4-22-21   Cardioversions   N/A    ICD/pacemaker implant       Tilt table studies   Care Everywhere 6-7-21   IMAGING (DISC & REPORT)      ECHO's   In process 5-28-21 Requested   Stress Tests   N/A    Cath   N/A    CT/CTA   N/A    MRI/MRA   N/A      Action 9/15/22 [facility]   Fax #[phone]   Action Taken Requested:    Echo    Autonomic reflex screen    Thermoregulatory sweat test   5-28-21 6-7-21 6-21-21        Action 9/15/22 Internal   Action Taken Holter 4-22-21 from [facility] in [facility]    EKG 4-8-21 from [facility] in [facility]

## 2022-09-18 ENCOUNTER — HEALTH MAINTENANCE LETTER (OUTPATIENT)
Age: 52
End: 2022-09-18

## 2022-09-19 ASSESSMENT — ENCOUNTER SYMPTOMS
HYPERTENSION: 0
NECK PAIN: 1
STIFFNESS: 1
MUSCLE CRAMPS: 0
ARTHRALGIAS: 1
HOARSE VOICE: 0
SINUS PAIN: 1
EXERCISE INTOLERANCE: 1
JOINT SWELLING: 0
TASTE DISTURBANCE: 1
SINUS CONGESTION: 1
SORE THROAT: 0
ORTHOPNEA: 0
SLEEP DISTURBANCES DUE TO BREATHING: 0
MYALGIAS: 1
HYPOTENSION: 0
LIGHT-HEADEDNESS: 1
NECK MASS: 0
TROUBLE SWALLOWING: 0
SYNCOPE: 0
MUSCLE WEAKNESS: 0
LEG PAIN: 0
PALPITATIONS: 1
SMELL DISTURBANCE: 1
BACK PAIN: 0

## 2022-09-20 ENCOUNTER — OFFICE VISIT (OUTPATIENT)
Dept: CARDIOLOGY | Facility: CLINIC | Age: 52
End: 2022-09-20
Attending: INTERNAL MEDICINE
Payer: COMMERCIAL

## 2022-09-20 ENCOUNTER — PATIENT OUTREACH (OUTPATIENT)
Dept: CARDIOLOGY | Facility: CLINIC | Age: 52
End: 2022-09-20

## 2022-09-20 ENCOUNTER — PRE VISIT (OUTPATIENT)
Dept: CARDIOLOGY | Facility: CLINIC | Age: 52
End: 2022-09-20

## 2022-09-20 VITALS — HEIGHT: 65 IN | WEIGHT: 169.1 LBS | BODY MASS INDEX: 28.17 KG/M2 | OXYGEN SATURATION: 97 %

## 2022-09-20 DIAGNOSIS — R00.0 TACHYCARDIA: ICD-10-CM

## 2022-09-20 DIAGNOSIS — G90.A POTS (POSTURAL ORTHOSTATIC TACHYCARDIA SYNDROME): Primary | ICD-10-CM

## 2022-09-20 PROCEDURE — G0463 HOSPITAL OUTPT CLINIC VISIT: HCPCS | Mod: 25

## 2022-09-20 PROCEDURE — 99203 OFFICE O/P NEW LOW 30 MIN: CPT | Performed by: INTERNAL MEDICINE

## 2022-09-20 PROCEDURE — 93005 ELECTROCARDIOGRAM TRACING: CPT

## 2022-09-20 RX ORDER — CALCIUM CARBONATE 300MG(750)
1 TABLET,CHEWABLE ORAL DAILY
COMMUNITY
Start: 2021-04-07

## 2022-09-20 RX ORDER — IVABRADINE 5 MG/1
5 TABLET, FILM COATED ORAL 2 TIMES DAILY
Qty: 180 TABLET | Refills: 3 | Status: SHIPPED | OUTPATIENT
Start: 2022-09-20 | End: 2022-09-22

## 2022-09-20 RX ORDER — IVABRADINE 5 MG/1
5 TABLET, FILM COATED ORAL 2 TIMES DAILY
COMMUNITY
Start: 2021-12-10 | End: 2022-09-20

## 2022-09-20 RX ORDER — CETIRIZINE HYDROCHLORIDE 10 MG/1
10 TABLET ORAL PRN
COMMUNITY
Start: 2020-09-30

## 2022-09-20 ASSESSMENT — PAIN SCALES - GENERAL
PAINLEVEL: NO PAIN (0)

## 2022-09-20 NOTE — LETTER
9/20/2022      RE: Pamella Lenz  [address] [place] [id number]       Dear Colleague,    Thank you for the opportunity to participate in the care of your patient, Pamella Lenz, at the [facility] HEART [facility]. Please see a copy of my visit note below.    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  Patient indicates that she first and was affected by COVID in January 2021 and subsequently had 2 more exposures in 2022.  For the most part her symptoms were predominantly neurologic in terms of lightheadedness and brain fog.  She had relatively little in the way of respiratory issues.  However she did have substantial diminution of exercise tolerance and continues to have symptoms of extreme  feeling unwell with undertaking physical exertion.    At the [facility] she underwent autonomic studies which did not reveal typical POTS criteria.  Today in the clinic she also did not exhibit typical POTS although she is taking ivabradine 5 mg twice daily.  Her hemodynamics today were as follows:    Supine blood pressure 121/77, heart rate 63  Immediate seated blood pressure 115/72 heart rate 67 with some sense of dizziness  Immediate standing 120/82 with heart rate 80 and same dizziness  Standing 1 minute blood pressure 114/77 heart rate 79 with complaints of dizziness  Standing 3 minutes blood pressure 111/76 heart rate 79 same symptoms  Standing 5 minutes blood pressure 111/77 heart rate 78 with same symptoms.    Electrocardiogram today showed sinus rhythm with a heart rate of 64 bpm.  ECG was normal.    Patient's history indicates that she is an active exerciser and has seen improvement in her exercise tolerance over the last many months.  Her main complaint is feeling poorly when exercising.  She has also noted resting heart rates in the  90s that concern her.    Pamella has had a number of cardiac evaluations in the recent past.  These include:  5/28/2021-24-hour Holter which showed only rare ectopy  528/21 echocardiogram showed 58% ejection fraction (normal)    At today's visit we did discuss the fact that she does not typical laboratory or clinical findings of POTS.  However she may have a POTS-like syndrome associated with COVID and its delayed recovery.  I note that she is not vaccinated.  We did discuss vaccines today and I indicated since her last COVID exposure was within the last 1 to 2 months it would be appropriate to wait a couple more months before obtaining the vaccination.    My recommendation today was for Pamella to continue her progressive increase in stamina associated with her exercise program.  The addition of lower body isometrics and possible use of spanx type undergarments may be advantageous.  Finally Pamella requested that we renew her prescription for ivabradine which was originally provided at [facility].  We will send a prescription request to [facility] and if that is rejected in terms of insurance coverage that she will continue get medication from [facility] (pharmacy: [facility]).    PAST MEDICAL HISTORY:  No past medical history on file.    CURRENT MEDICATIONS:  Current Outpatient Medications   Medication Sig Dispense Refill     cetirizine (ZYRTEC) 10 MG tablet Take 10 mg by mouth as needed       Cyanocobalamin 1000 MCG CAPS        fluticasone (FLONASE) 50 MCG/ACT spray Spray 1 spray into both nostrils daily       IBUPROFEN PO        ivabradine (CORLANOR) 5 MG tablet Take 5 mg by mouth 2 times daily       Magnesium 400 MG TABS Take 1 tablet by mouth daily       melatonin 5 MG tablet Take 5 mg by mouth as needed       HYDROcodone-acetaminophen (NORCO) 5-325 MG per tablet Take 1 tablet by mouth every 4 hours as needed for pain 15 tablet 0     methocarbamol (ROBAXIN) 750 MG tablet Take 1 tablet (750 mg) by  "mouth 4 times daily as needed for muscle spasms 15 tablet 0       PAST SURGICAL HISTORY:  Past Surgical History:   Procedure Laterality Date     BREAST SURGERY       GYN SURGERY         ALLERGIES:     Allergies   Allergen Reactions     Lexapro Rash and Unknown       FAMILY HISTORY:  No family history on file.       SOCIAL HISTORY:  Social History     Tobacco Use     Smoking status: Former Smoker     Smokeless tobacco: Never Used   Substance Use Topics     Alcohol use: Yes     Drug use: No       ROS:   Constitutional: No fever, chills, or sweats. Weight stable.   ENT: No visual disturbance, ear ache, epistaxis, sore throat.   Cardiovascular: As per HPI.   Respiratory: No cough, hemoptysis.    GI: No nausea, vomiting, a.   : No hematuria.   Integument: Negative.   Psychiatric: Negative.   Hematologic:  Easy bruising, no easy bleeding.  Neuro: Negative.   Endocrinology: No significant heat or cold intolerance   Musculoskeletal: No myalgia.    Exam:  Ht 1.653 m (5' 5.08\")   Wt 76.7 kg (169 lb 1.6 oz)   SpO2 97%   BMI 28.07 kg/m    GENERAL APPEARANCE: healthy, alert and no distress  HEENT: no central cyanosis  NECK:  JVP not elevated  RESPIRATORY:  muscles, no retractions, respirations are unlabored, normal respiratory rate  NEURO: alert and oriented to person/place/time, normal speech, gait and affect   posterior tibialis pulses are normal in volumes and symmetric bilaterally. No bruits are heard.  SKIN: no ecchymoses, no rashes    Labs:  CBC RESULTS:   Lab Results   Component Value Date    WBC 6.7 04/11/2021    RBC 4.35 04/11/2021    HGB 13.4 04/11/2021    HCT 39.6 04/11/2021    MCV 91 04/11/2021    MCH 30.8 04/11/2021    MCHC 33.8 04/11/2021    RDW 11.9 04/11/2021     04/11/2021       BMP RESULTS:  Lab Results   Component Value Date     04/11/2021    POTASSIUM 3.8 04/11/2021    CHLORIDE 109 04/11/2021    CO2 26 04/11/2021    ANIONGAP 4 04/11/2021    GLC 92 04/11/2021    BUN 9 04/11/2021    CR 0.65 " 04/11/2021    GFRESTIMATED >90 04/11/2021    GFRESTBLACK >90 04/11/2021    PRACHI 8.6 04/11/2021        INR RESULTS:  No results found for: INR    Procedures:  PULMONARY FUNCTION TESTS:   No flowsheet data found.      ECHOCARDIOGRAM:   No results found for this or any previous visit (from the past 8760 hour(s)).      Assessment and Plan:   1.  Status post COVID exposures  2.  Considering regarding POTS-like syndrome but showing slow exercise improvement    Plan  1.  Provide prescription for ivabradine 5 mg twice daily to [facility] and if that is declined for insurance we will provide a paper prescription for [place] pharmacy.  2.  Follow-up as needed    Total elapsed time today with chart review, clinic visit and documentation 30 minutes    I very much appreciated the opportunity to see and assess Pamella Lenz in the clinic today. Please do not hesitate to contact my office if you have any questions or concerns.      Mitch Cox MD  Cardiac Arrhythmia Service  [facility]  [phone]

## 2022-09-20 NOTE — PATIENT INSTRUCTIONS
You were seen in the Electrophysiology Clinic today by:   Dr Cox    Plan:   Medication Changes:   CONTINUE- Ivabradine 5mg twice a day    Follow up visit:  As needed with Dr Cox    If you have further questions, please utilize [employer] to contact us.     Your Care Team:    EP Cardiology   Telephone Number     Nurse Line  Angelica Jensen, RN   Margarita Ricardo, TIMMY   [phone]     For scheduling appointments:   [place]   [phone]   For procedure scheduling:    Torrie [facility]     [phone]   For the Device Clinic (Pacemakers, ICDs, Loop Recorders)    During business hours: [phone]  After business hours:   [phone]- select option 4 and ask for job code [id number].       On-call cardiologist for after hours or on weekends:   [phone], option #4, and ask to speak to the on-call cardiologist.     Cardiovascular Clinic:   [address] [place] [address]. [place] [address]      As always, Thank you for trusting us with your health care needs!

## 2022-09-20 NOTE — NURSING NOTE
Chief Complaint   Patient presents with     New Patient     New POTS- NEW- post covid symptoms, anxiety, tachycardia, postural dizziness, insomnia, nausea     Orthostatic vitals were taken, medications reconciled, and EKG was performed.    Freeman Mayorga, EMT  3:53 PM

## 2022-09-20 NOTE — PROGRESS NOTES
HPI:   Pamella is a 52-year-old woman who comes for follow-up evaluation after being studied at 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  Patient indicates that she first and was affected by COVID in January 2021 and subsequently had 2 more exposures in 2022.  For the most part her symptoms were predominantly neurologic in terms of lightheadedness and brain fog.  She had relatively little in the way of respiratory issues.  However she did have substantial diminution of exercise tolerance and continues to have symptoms of extreme  feeling unwell with undertaking physical exertion.    At the [facility] she underwent autonomic studies which did not reveal typical POTS criteria.  Today in the clinic she also did not exhibit typical POTS although she is taking ivabradine 5 mg twice daily.  Her hemodynamics today were as follows:    Supine blood pressure 121/77, heart rate 63  Immediate seated blood pressure 115/72 heart rate 67 with some sense of dizziness  Immediate standing 120/82 with heart rate 80 and same dizziness  Standing 1 minute blood pressure 114/77 heart rate 79 with complaints of dizziness  Standing 3 minutes blood pressure 111/76 heart rate 79 same symptoms  Standing 5 minutes blood pressure 111/77 heart rate 78 with same symptoms.    Electrocardiogram today showed sinus rhythm with a heart rate of 64 bpm.  ECG was normal.    Patient's history indicates that she is an active exerciser and has seen improvement in her exercise tolerance over the last many months.  Her main complaint is feeling poorly when exercising.  She has also noted resting heart rates in the 90s that concern her.    Pamella has had a number of cardiac evaluations in the recent past.  These include:  5/28/2021-24-hour Holter which showed only rare ectopy  528/21 echocardiogram showed 58% ejection fraction (normal)    At today's visit we did discuss the fact that she does not typical laboratory or clinical findings of POTS.   However she may have a POTS-like syndrome associated with COVID and its delayed recovery.  I note that she is not vaccinated.  We did discuss vaccines today and I indicated since her last COVID exposure was within the last 1 to 2 months it would be appropriate to wait a couple more months before obtaining the vaccination.    My recommendation today was for Pamella to continue her progressive increase in stamina associated with her exercise program.  The addition of lower body isometrics and possible use of spanx type undergarments may be advantageous.  Finally Pamella requested that we renew her prescription for ivabradine which was originally provided at [facility].  We will send a prescription request to [facility] in [facility] and if that is rejected in terms of insurance coverage that she will continue get medication from [facility] (pharmacy: [facility]).    PAST MEDICAL HISTORY:  No past medical history on file.    CURRENT MEDICATIONS:  Current Outpatient Medications   Medication Sig Dispense Refill     cetirizine (ZYRTEC) 10 MG tablet Take 10 mg by mouth as needed       Cyanocobalamin 1000 MCG CAPS        fluticasone (FLONASE) 50 MCG/ACT spray Spray 1 spray into both nostrils daily       IBUPROFEN PO        ivabradine (CORLANOR) 5 MG tablet Take 5 mg by mouth 2 times daily       Magnesium 400 MG TABS Take 1 tablet by mouth daily       melatonin 5 MG tablet Take 5 mg by mouth as needed       HYDROcodone-acetaminophen (NORCO) 5-325 MG per tablet Take 1 tablet by mouth every 4 hours as needed for pain 15 tablet 0     methocarbamol (ROBAXIN) 750 MG tablet Take 1 tablet (750 mg) by mouth 4 times daily as needed for muscle spasms 15 tablet 0       PAST SURGICAL HISTORY:  Past Surgical History:   Procedure Laterality Date     BREAST SURGERY       GYN SURGERY         ALLERGIES:     Allergies   Allergen Reactions     Lexapro Rash and Unknown       FAMILY HISTORY:  No family history on file.       SOCIAL  "HISTORY:  Social History     Tobacco Use     Smoking status: Former Smoker     Smokeless tobacco: Never Used   Substance Use Topics     Alcohol use: Yes     Drug use: No       ROS:   Constitutional: No fever, chills, or sweats. Weight stable.   ENT: No visual disturbance, ear ache, epistaxis, sore throat.   Cardiovascular: As per HPI.   Respiratory: No cough, hemoptysis.    GI: No nausea, vomiting, a.   : No hematuria.   Integument: Negative.   Psychiatric: Negative.   Hematologic:  Easy bruising, no easy bleeding.  Neuro: Negative.   Endocrinology: No significant heat or cold intolerance   Musculoskeletal: No myalgia.    Exam:  Ht 1.653 m (5' 5.08\")   Wt 76.7 kg (169 lb 1.6 oz)   SpO2 97%   BMI 28.07 kg/m    GENERAL APPEARANCE: healthy, alert and no distress  HEENT: no central cyanosis  NECK:  JVP not elevated  RESPIRATORY:  muscles, no retractions, respirations are unlabored, normal respiratory rate  NEURO: alert and oriented to person/place/time, normal speech, gait and affect   posterior tibialis pulses are normal in volumes and symmetric bilaterally. No bruits are heard.  SKIN: no ecchymoses, no rashes    Labs:  CBC RESULTS:   Lab Results   Component Value Date    WBC 6.7 04/11/2021    RBC 4.35 04/11/2021    HGB 13.4 04/11/2021    HCT 39.6 04/11/2021    MCV 91 04/11/2021    MCH 30.8 04/11/2021    MCHC 33.8 04/11/2021    RDW 11.9 04/11/2021     04/11/2021       BMP RESULTS:  Lab Results   Component Value Date     04/11/2021    POTASSIUM 3.8 04/11/2021    CHLORIDE 109 04/11/2021    CO2 26 04/11/2021    ANIONGAP 4 04/11/2021    GLC 92 04/11/2021    BUN 9 04/11/2021    CR 0.65 04/11/2021    GFRESTIMATED >90 04/11/2021    GFRESTBLACK >90 04/11/2021    PRACHI 8.6 04/11/2021        INR RESULTS:  No results found for: INR    Procedures:  PULMONARY FUNCTION TESTS:   No flowsheet data found.      ECHOCARDIOGRAM:   No results found for this or any previous visit (from the past 8760 hour(s)).      Assessment " and Plan:   1.  Status post COVID exposures  2.  Considering regarding POTS-like syndrome but showing slow exercise improvement    Plan  1.  Provide prescription for ivabradine 5 mg twice daily to [facility] and if that is declined for insurance we will provide a paper prescription for [place] pharmacy.  2.  Follow-up as needed    Total elapsed time today with chart review, clinic visit and documentation 30 minutes    I very much appreciated the opportunity to see and assess Pamella Lenz in the clinic today. Please do not hesitate to contact my office if you have any questions or concerns.      Mitch Cox MD  Cardiac Arrhythmia Service  [facility]  [phone]      CC  SELF, REFERRED

## 2022-09-20 NOTE — TELEPHONE ENCOUNTER
Prior Authorization Retail Medication Request    Medication/Dose: Ivabradine 5mg BID  ICD code (if different than what is on RX):    Previously Tried and Failed:  Propranolol & Metoprolol  Rationale:  Has not tolerated beta blockers, has had success with Ivabradine previously    Insurance Name:    Insurance ID:        Pharmacy Information (if different than what is on RX)  Name:    Phone:

## 2022-09-21 LAB
ATRIAL RATE - MUSE: 64 BPM
DIASTOLIC BLOOD PRESSURE - MUSE: NORMAL MMHG
INTERPRETATION ECG - MUSE: NORMAL
P AXIS - MUSE: 46 DEGREES
PR INTERVAL - MUSE: 134 MS
QRS DURATION - MUSE: 82 MS
QT - MUSE: 412 MS
QTC - MUSE: 425 MS
R AXIS - MUSE: 66 DEGREES
SYSTOLIC BLOOD PRESSURE - MUSE: NORMAL MMHG
T AXIS - MUSE: 50 DEGREES
VENTRICULAR RATE- MUSE: 64 BPM

## 2022-09-21 NOTE — TELEPHONE ENCOUNTER
PRIOR AUTHORIZATION DENIED    Medication: ivabradine (CORLANOR) 5 MG tablet - EPA DENIED    Denial Date: 9/21/2022    Denial Rational:       Appeal Information:

## 2023-07-30 ENCOUNTER — HEALTH MAINTENANCE LETTER (OUTPATIENT)
Age: 53
End: 2023-07-30

## 2023-10-04 ENCOUNTER — TELEPHONE (OUTPATIENT)
Dept: CARDIOLOGY | Facility: CLINIC | Age: 53
End: 2023-10-04
Payer: COMMERCIAL

## 2024-01-09 ENCOUNTER — TRANSFERRED RECORDS (OUTPATIENT)
Dept: HEALTH INFORMATION MANAGEMENT | Facility: CLINIC | Age: 54
End: 2024-01-09
Payer: COMMERCIAL

## 2024-01-23 ENCOUNTER — APPOINTMENT (OUTPATIENT)
Dept: URBAN - METROPOLITAN AREA CLINIC 257 | Age: 54
Setting detail: DERMATOLOGY
End: 2024-01-31

## 2024-01-23 DIAGNOSIS — H02.6 XANTHELASMA OF EYELID: ICD-10-CM

## 2024-01-23 DIAGNOSIS — L91.8 OTHER HYPERTROPHIC DISORDERS OF THE SKIN: ICD-10-CM

## 2024-01-23 DIAGNOSIS — L57.8 OTHER SKIN CHANGES DUE TO CHRONIC EXPOSURE TO NONIONIZING RADIATION: ICD-10-CM

## 2024-01-23 PROBLEM — H02.64 XANTHELASMA OF LEFT UPPER EYELID: Status: ACTIVE | Noted: 2024-01-23

## 2024-01-23 PROBLEM — H02.61 XANTHELASMA OF RIGHT UPPER EYELID: Status: ACTIVE | Noted: 2024-01-23

## 2024-01-23 PROCEDURE — 17110 DESTRUCT B9 LESION 1-14: CPT

## 2024-01-23 PROCEDURE — OTHER MIPS QUALITY: OTHER

## 2024-01-23 PROCEDURE — OTHER BENIGN DESTRUCTION: OTHER

## 2024-01-23 PROCEDURE — OTHER SUNSCREEN RECOMMENDATIONS: OTHER

## 2024-01-23 PROCEDURE — 99212 OFFICE O/P EST SF 10 MIN: CPT | Mod: 25

## 2024-01-23 PROCEDURE — OTHER COUNSELING: OTHER

## 2024-01-23 ASSESSMENT — LOCATION SIMPLE DESCRIPTION DERM
LOCATION SIMPLE: LEFT SUPERIOR EYELID
LOCATION SIMPLE: RIGHT CHEEK
LOCATION SIMPLE: RIGHT SUPERIOR EYELID

## 2024-01-23 ASSESSMENT — LOCATION ZONE DERM
LOCATION ZONE: FACE
LOCATION ZONE: EYELID

## 2024-01-23 ASSESSMENT — LOCATION DETAILED DESCRIPTION DERM
LOCATION DETAILED: LEFT MEDIAL SUPERIOR EYELID
LOCATION DETAILED: RIGHT MEDIAL SUPERIOR EYELID
LOCATION DETAILED: RIGHT LATERAL SUPERIOR EYELID
LOCATION DETAILED: LEFT LATERAL SUPERIOR EYELID
LOCATION DETAILED: RIGHT CENTRAL MALAR CHEEK

## 2024-01-23 NOTE — PROCEDURE: COUNSELING
Detail Level: Zone
Patient Specific Counseling (Will Not Stick From Patient To Patient): **discussed with pt that these are difficult to treat. Will try electrodessication today and pt advised it might not be effective or only partially effective with a risk of hypo or hyperpigmentation or even scarring from today’s treatment.

## 2024-01-23 NOTE — PROCEDURE: BENIGN DESTRUCTION
Total Number Of Lesions Treated: 3
Detail Level: Zone
Add 52 Modifier (Optional): no
Consent: The patient's consent was obtained including but not limited to risks of crusting, scabbing, blistering, scarring, darker or lighter pigmentary change, recurrence, incomplete removal and infection.
Medical Necessity Clause: This procedure was medically necessary because the lesions that were treated were:
Medical Necessity Information: It is in your best interest to select a reason for this procedure from the list below. All of these items fulfill various CMS LCD requirements except the new and changing color options.
Post-Care Instructions: I reviewed with the patient in detail post-care instructions. Patient is to wear sunprotection, and avoid picking at any of the treated lesions. Pt may apply Vaseline to crusted or scabbing areas.
Anesthesia Volume In Cc: 0.5
Total Number Of Lesions Treated: 4

## 2024-03-21 ENCOUNTER — TELEPHONE (OUTPATIENT)
Dept: CARDIOLOGY | Facility: CLINIC | Age: 54
End: 2024-03-21
Payer: COMMERCIAL

## 2024-03-21 NOTE — TELEPHONE ENCOUNTER
Patient Contacted for the patient to call back and schedule the following:    Appointment type: return cardio  Provider: can   Return date: 05/02/24  Specialty phone number: [phone] opt 1  Additional appointment(s) needed: n/a   Additonal Notes: n/a

## 2024-09-22 ENCOUNTER — HEALTH MAINTENANCE LETTER (OUTPATIENT)
Age: 54
End: 2024-09-22

## 2024-12-09 ENCOUNTER — APPOINTMENT (OUTPATIENT)
Dept: URBAN - METROPOLITAN AREA CLINIC 257 | Age: 54
Setting detail: DERMATOLOGY
End: 2024-12-09

## 2024-12-09 DIAGNOSIS — H02.6 XANTHELASMA OF EYELID: ICD-10-CM

## 2024-12-09 PROBLEM — H02.61 XANTHELASMA OF RIGHT UPPER EYELID: Status: ACTIVE | Noted: 2024-12-09

## 2024-12-09 PROBLEM — H02.64 XANTHELASMA OF LEFT UPPER EYELID: Status: ACTIVE | Noted: 2024-12-09

## 2024-12-09 PROBLEM — H02.63 XANTHELASMA OF RIGHT EYE, UNSPECIFIED EYELID: Status: ACTIVE | Noted: 2024-12-09

## 2024-12-09 PROCEDURE — OTHER MIPS QUALITY: OTHER

## 2024-12-09 PROCEDURE — OTHER DEFER: OTHER

## 2024-12-09 PROCEDURE — 99212 OFFICE O/P EST SF 10 MIN: CPT

## 2024-12-09 PROCEDURE — OTHER COUNSELING: OTHER

## 2024-12-09 PROCEDURE — OTHER PRESCRIPTION: OTHER

## 2024-12-09 RX ORDER — LIDOCAINE AND PRILOCAINE 25; 25 MG/G; MG/G
CREAM TOPICAL
Qty: 5 | Refills: 0 | Status: ERX | COMMUNITY
Start: 2024-12-09

## 2024-12-09 ASSESSMENT — LOCATION SIMPLE DESCRIPTION DERM
LOCATION SIMPLE: RIGHT SUPERIOR EYELID
LOCATION SIMPLE: RIGHT EYELID
LOCATION SIMPLE: LEFT SUPERIOR EYELID

## 2024-12-09 ASSESSMENT — LOCATION DETAILED DESCRIPTION DERM
LOCATION DETAILED: LEFT LATERAL SUPERIOR EYELID
LOCATION DETAILED: RIGHT MEDIAL CANTHUS
LOCATION DETAILED: RIGHT LATERAL SUPERIOR EYELID

## 2024-12-09 ASSESSMENT — LOCATION ZONE DERM: LOCATION ZONE: EYELID

## 2024-12-09 NOTE — HPI: SKIN LESION
What Type Of Note Output Would You Prefer (Optional)?: Standard Output
Is This A New Presentation, Or A Follow-Up?: Skin Lesions
Additional History: Patient states she has some lesions around her eyes that she would like removed. She states she has had them previously treated with cryotherapy. Patient denies any other concerns.

## 2024-12-09 NOTE — PROCEDURE: COUNSELING
Detail Level: Zone
Patient Specific Counseling (Will Not Stick From Patient To Patient): **discussed with pt that these are difficult to treat. Will try electrodessication today and pt advised it might not be effective or only partially effective with a risk of hypo or hyperpigmentation or even scarring from today’s treatment.
Detail Level: Detailed

## 2024-12-09 NOTE — PROCEDURE: DEFER
Instructions (Optional): Patient will schedule a separate appointment for electrodessication.
Size Of Lesion In Cm (Optional): 0
Introduction Text (Please End With A Colon): The following procedure was deferred:
Detail Level: Detailed

## 2025-01-09 ENCOUNTER — APPOINTMENT (OUTPATIENT)
Dept: URBAN - METROPOLITAN AREA CLINIC 257 | Age: 55
Setting detail: DERMATOLOGY
End: 2025-01-13

## 2025-01-09 DIAGNOSIS — H02.6 XANTHELASMA OF EYELID: ICD-10-CM

## 2025-01-09 PROBLEM — H02.64 XANTHELASMA OF LEFT UPPER EYELID: Status: ACTIVE | Noted: 2025-01-09

## 2025-01-09 PROBLEM — H02.63 XANTHELASMA OF RIGHT EYE, UNSPECIFIED EYELID: Status: ACTIVE | Noted: 2025-01-09

## 2025-01-09 PROBLEM — H02.66 XANTHELASMA OF LEFT EYE, UNSPECIFIED EYELID: Status: ACTIVE | Noted: 2025-01-09

## 2025-01-09 PROBLEM — H02.61 XANTHELASMA OF RIGHT UPPER EYELID: Status: ACTIVE | Noted: 2025-01-09

## 2025-01-09 PROCEDURE — OTHER MIPS QUALITY: OTHER

## 2025-01-09 PROCEDURE — 17110 DESTRUCT B9 LESION 1-14: CPT

## 2025-01-09 PROCEDURE — OTHER ELECTRODESICCATION: OTHER

## 2025-01-09 PROCEDURE — OTHER COUNSELING: OTHER

## 2025-01-09 ASSESSMENT — LOCATION SIMPLE DESCRIPTION DERM
LOCATION SIMPLE: RIGHT EYELID
LOCATION SIMPLE: LEFT EYELID
LOCATION SIMPLE: RIGHT SUPERIOR EYELID
LOCATION SIMPLE: LEFT SUPERIOR EYELID

## 2025-01-09 ASSESSMENT — LOCATION ZONE DERM: LOCATION ZONE: EYELID

## 2025-01-09 ASSESSMENT — LOCATION DETAILED DESCRIPTION DERM
LOCATION DETAILED: RIGHT MEDIAL SUPERIOR EYELID
LOCATION DETAILED: LEFT LATERAL SUPERIOR EYELID
LOCATION DETAILED: RIGHT MEDIAL CANTHUS
LOCATION DETAILED: LEFT MEDIAL CANTHUS
LOCATION DETAILED: RIGHT LATERAL SUPERIOR EYELID
LOCATION DETAILED: LEFT MEDIAL SUPERIOR EYELID

## 2025-01-09 NOTE — PROCEDURE: ELECTRODESICCATION
Include Z78.9 (Other Specified Conditions Influencing Health Status) As An Associated Diagnosis?: No
Anesthesia Type: 1% lidocaine with epinephrine
Medical Necessity Clause: This procedure was medically necessary because the lesions that were treated were:
Consent: The patient's consent was obtained including but not limited to risks of crusting, scabbing, blistering, scarring, darker or lighter pigmentary change, recurrence, incomplete removal and infection.
Medical Necessity Information: It is in your best interest to select a reason for this procedure from the list below. All of these items fulfill various CMS LCD requirements except the new and changing color options.
Post-Care Instructions: I reviewed with the patient in detail post-care instructions. Patient is to wear sunprotection, and avoid picking at any of the treated lesions. Pt may apply Vaseline to crusted or scabbing areas
Detail Level: Simple

## 2025-01-12 ENCOUNTER — HEALTH MAINTENANCE LETTER (OUTPATIENT)
Age: 55
End: 2025-01-12